# Patient Record
Sex: FEMALE | Race: WHITE | NOT HISPANIC OR LATINO | URBAN - METROPOLITAN AREA
[De-identification: names, ages, dates, MRNs, and addresses within clinical notes are randomized per-mention and may not be internally consistent; named-entity substitution may affect disease eponyms.]

---

## 2017-02-15 ENCOUNTER — EMERGENCY (EMERGENCY)
Facility: HOSPITAL | Age: 62
LOS: 1 days | Discharge: HOME | End: 2017-02-15
Attending: EMERGENCY MEDICINE | Admitting: EMERGENCY MEDICINE
Payer: COMMERCIAL

## 2017-02-15 ENCOUNTER — EMERGENCY (EMERGENCY)
Facility: HOSPITAL | Age: 62
LOS: 1 days | Discharge: ANOTHER HOSPITAL | End: 2017-02-15
Attending: EMERGENCY MEDICINE | Admitting: EMERGENCY MEDICINE
Payer: COMMERCIAL

## 2017-02-15 VITALS
DIASTOLIC BLOOD PRESSURE: 87 MMHG | OXYGEN SATURATION: 97 % | TEMPERATURE: 98 F | HEART RATE: 81 BPM | SYSTOLIC BLOOD PRESSURE: 139 MMHG | RESPIRATION RATE: 17 BRPM

## 2017-02-15 VITALS
SYSTOLIC BLOOD PRESSURE: 142 MMHG | HEIGHT: 66 IN | RESPIRATION RATE: 16 BRPM | WEIGHT: 169.09 LBS | TEMPERATURE: 99 F | OXYGEN SATURATION: 97 % | DIASTOLIC BLOOD PRESSURE: 82 MMHG | HEART RATE: 85 BPM

## 2017-02-15 VITALS
DIASTOLIC BLOOD PRESSURE: 78 MMHG | TEMPERATURE: 99 F | OXYGEN SATURATION: 100 % | RESPIRATION RATE: 20 BRPM | HEART RATE: 83 BPM | SYSTOLIC BLOOD PRESSURE: 153 MMHG

## 2017-02-15 DIAGNOSIS — S06.300A UNSPECIFIED FOCAL TRAUMATIC BRAIN INJURY WITHOUT LOSS OF CONSCIOUSNESS, INITIAL ENCOUNTER: ICD-10-CM

## 2017-02-15 DIAGNOSIS — R51 HEADACHE: ICD-10-CM

## 2017-02-15 DIAGNOSIS — S09.90XA UNSPECIFIED INJURY OF HEAD, INITIAL ENCOUNTER: ICD-10-CM
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
## 2017-02-15 DIAGNOSIS — Z88.3 ALLERGY STATUS TO OTHER ANTI-INFECTIVE AGENTS: ICD-10-CM

## 2017-02-15 DIAGNOSIS — Z88.8 ALLERGY STATUS TO OTHER DRUGS, MEDICAMENTS AND BIOLOGICAL SUBSTANCES STATUS: ICD-10-CM

## 2017-02-15 LAB
ALBUMIN SERPL ELPH-MCNC: 4.2 G/DL — SIGNIFICANT CHANGE UP (ref 3.3–5)
ALBUMIN SERPL ELPH-MCNC: 4.8 G/DL — SIGNIFICANT CHANGE UP (ref 3.3–5)
ALP SERPL-CCNC: 91 U/L — SIGNIFICANT CHANGE UP (ref 40–120)
ALP SERPL-CCNC: 94 U/L — SIGNIFICANT CHANGE UP (ref 40–120)
ALT FLD-CCNC: 20 U/L RC — SIGNIFICANT CHANGE UP (ref 10–45)
ALT FLD-CCNC: 27 U/L — SIGNIFICANT CHANGE UP (ref 12–78)
ANION GAP SERPL CALC-SCNC: 16 MMOL/L — SIGNIFICANT CHANGE UP (ref 5–17)
ANION GAP SERPL CALC-SCNC: 8 MMOL/L — SIGNIFICANT CHANGE UP (ref 5–17)
APTT BLD: 29.3 SEC — SIGNIFICANT CHANGE UP (ref 27.5–37.4)
APTT BLD: 29.4 SEC — SIGNIFICANT CHANGE UP (ref 27.5–37.4)
AST SERPL-CCNC: 23 U/L — SIGNIFICANT CHANGE UP (ref 15–37)
AST SERPL-CCNC: 26 U/L — SIGNIFICANT CHANGE UP (ref 10–40)
BASOPHILS # BLD AUTO: 0 K/UL — SIGNIFICANT CHANGE UP (ref 0–0.2)
BASOPHILS # BLD AUTO: 0.1 K/UL — SIGNIFICANT CHANGE UP (ref 0–0.2)
BASOPHILS NFR BLD AUTO: 0.2 % — SIGNIFICANT CHANGE UP (ref 0–2)
BASOPHILS NFR BLD AUTO: 0.9 % — SIGNIFICANT CHANGE UP (ref 0–2)
BILIRUB SERPL-MCNC: 0.4 MG/DL — SIGNIFICANT CHANGE UP (ref 0.2–1.2)
BILIRUB SERPL-MCNC: 0.4 MG/DL — SIGNIFICANT CHANGE UP (ref 0.2–1.2)
BUN SERPL-MCNC: 13 MG/DL — SIGNIFICANT CHANGE UP (ref 7–23)
BUN SERPL-MCNC: 14 MG/DL — SIGNIFICANT CHANGE UP (ref 7–23)
CALCIUM SERPL-MCNC: 8.8 MG/DL — SIGNIFICANT CHANGE UP (ref 8.5–10.1)
CALCIUM SERPL-MCNC: 9.9 MG/DL — SIGNIFICANT CHANGE UP (ref 8.4–10.5)
CHLORIDE SERPL-SCNC: 101 MMOL/L — SIGNIFICANT CHANGE UP (ref 96–108)
CHLORIDE SERPL-SCNC: 105 MMOL/L — SIGNIFICANT CHANGE UP (ref 96–108)
CO2 SERPL-SCNC: 22 MMOL/L — SIGNIFICANT CHANGE UP (ref 22–31)
CO2 SERPL-SCNC: 27 MMOL/L — SIGNIFICANT CHANGE UP (ref 22–31)
CREAT SERPL-MCNC: 0.8 MG/DL — SIGNIFICANT CHANGE UP (ref 0.5–1.3)
CREAT SERPL-MCNC: 0.81 MG/DL — SIGNIFICANT CHANGE UP (ref 0.5–1.3)
EOSINOPHIL # BLD AUTO: 0.1 K/UL — SIGNIFICANT CHANGE UP (ref 0–0.5)
EOSINOPHIL # BLD AUTO: 0.1 K/UL — SIGNIFICANT CHANGE UP (ref 0–0.5)
EOSINOPHIL NFR BLD AUTO: 0.6 % — SIGNIFICANT CHANGE UP (ref 0–6)
EOSINOPHIL NFR BLD AUTO: 0.7 % — SIGNIFICANT CHANGE UP (ref 0–6)
GLUCOSE SERPL-MCNC: 103 MG/DL — HIGH (ref 70–99)
GLUCOSE SERPL-MCNC: 95 MG/DL — SIGNIFICANT CHANGE UP (ref 70–99)
HCT VFR BLD CALC: 43.3 % — SIGNIFICANT CHANGE UP (ref 34.5–45)
HCT VFR BLD CALC: 43.7 % — SIGNIFICANT CHANGE UP (ref 34.5–45)
HGB BLD-MCNC: 14.5 G/DL — SIGNIFICANT CHANGE UP (ref 11.5–15.5)
HGB BLD-MCNC: 14.7 G/DL — SIGNIFICANT CHANGE UP (ref 11.5–15.5)
INR BLD: 1.1 RATIO — SIGNIFICANT CHANGE UP (ref 0.88–1.16)
INR BLD: 1.12 RATIO — SIGNIFICANT CHANGE UP (ref 0.88–1.16)
LYMPHOCYTES # BLD AUTO: 1.6 K/UL — SIGNIFICANT CHANGE UP (ref 1–3.3)
LYMPHOCYTES # BLD AUTO: 1.6 K/UL — SIGNIFICANT CHANGE UP (ref 1–3.3)
LYMPHOCYTES # BLD AUTO: 17.1 % — SIGNIFICANT CHANGE UP (ref 13–44)
LYMPHOCYTES # BLD AUTO: 17.3 % — SIGNIFICANT CHANGE UP (ref 13–44)
MCHC RBC-ENTMCNC: 29.4 PG — SIGNIFICANT CHANGE UP (ref 27–34)
MCHC RBC-ENTMCNC: 29.5 PG — SIGNIFICANT CHANGE UP (ref 27–34)
MCHC RBC-ENTMCNC: 33.5 GM/DL — SIGNIFICANT CHANGE UP (ref 32–36)
MCHC RBC-ENTMCNC: 33.7 GM/DL — SIGNIFICANT CHANGE UP (ref 32–36)
MCV RBC AUTO: 87.4 FL — SIGNIFICANT CHANGE UP (ref 80–100)
MCV RBC AUTO: 87.5 FL — SIGNIFICANT CHANGE UP (ref 80–100)
MONOCYTES # BLD AUTO: 0.7 K/UL — SIGNIFICANT CHANGE UP (ref 0–0.9)
MONOCYTES # BLD AUTO: 0.7 K/UL — SIGNIFICANT CHANGE UP (ref 0–0.9)
MONOCYTES NFR BLD AUTO: 7.5 % — SIGNIFICANT CHANGE UP (ref 1–9)
MONOCYTES NFR BLD AUTO: 7.6 % — SIGNIFICANT CHANGE UP (ref 2–14)
NEUTROPHILS # BLD AUTO: 7.1 K/UL — SIGNIFICANT CHANGE UP (ref 1.8–7.4)
NEUTROPHILS # BLD AUTO: 7.1 K/UL — SIGNIFICANT CHANGE UP (ref 1.8–7.4)
NEUTROPHILS NFR BLD AUTO: 73.8 % — SIGNIFICANT CHANGE UP (ref 43–77)
NEUTROPHILS NFR BLD AUTO: 74.2 % — SIGNIFICANT CHANGE UP (ref 43–77)
PLATELET # BLD AUTO: 230 K/UL — SIGNIFICANT CHANGE UP (ref 150–400)
PLATELET # BLD AUTO: 231 K/UL — SIGNIFICANT CHANGE UP (ref 150–400)
POTASSIUM SERPL-MCNC: 4.3 MMOL/L — SIGNIFICANT CHANGE UP (ref 3.5–5.3)
POTASSIUM SERPL-MCNC: 4.6 MMOL/L — SIGNIFICANT CHANGE UP (ref 3.5–5.3)
POTASSIUM SERPL-SCNC: 4.3 MMOL/L — SIGNIFICANT CHANGE UP (ref 3.5–5.3)
POTASSIUM SERPL-SCNC: 4.6 MMOL/L — SIGNIFICANT CHANGE UP (ref 3.5–5.3)
PROT SERPL-MCNC: 8.1 G/DL — SIGNIFICANT CHANGE UP (ref 6–8.3)
PROT SERPL-MCNC: 8.2 G/DL — SIGNIFICANT CHANGE UP (ref 6–8.3)
PROTHROM AB SERPL-ACNC: 11.9 SEC — SIGNIFICANT CHANGE UP (ref 10–13.1)
PROTHROM AB SERPL-ACNC: 12.4 SEC — SIGNIFICANT CHANGE UP (ref 10–13.1)
RBC # BLD: 4.95 M/UL — SIGNIFICANT CHANGE UP (ref 3.8–5.2)
RBC # BLD: 5 M/UL — SIGNIFICANT CHANGE UP (ref 3.8–5.2)
RBC # FLD: 11.3 % — SIGNIFICANT CHANGE UP (ref 10.3–14.5)
RBC # FLD: 11.7 % — SIGNIFICANT CHANGE UP (ref 10.3–14.5)
SODIUM SERPL-SCNC: 139 MMOL/L — SIGNIFICANT CHANGE UP (ref 135–145)
SODIUM SERPL-SCNC: 140 MMOL/L — SIGNIFICANT CHANGE UP (ref 135–145)
WBC # BLD: 9.5 K/UL — SIGNIFICANT CHANGE UP (ref 3.8–10.5)
WBC # BLD: 9.6 K/UL — SIGNIFICANT CHANGE UP (ref 3.8–10.5)
WBC # FLD AUTO: 9.5 K/UL — SIGNIFICANT CHANGE UP (ref 3.8–10.5)
WBC # FLD AUTO: 9.6 K/UL — SIGNIFICANT CHANGE UP (ref 3.8–10.5)

## 2017-02-15 PROCEDURE — 93005 ELECTROCARDIOGRAM TRACING: CPT

## 2017-02-15 PROCEDURE — 85610 PROTHROMBIN TIME: CPT

## 2017-02-15 PROCEDURE — 71046 X-RAY EXAM CHEST 2 VIEWS: CPT

## 2017-02-15 PROCEDURE — 99284 EMERGENCY DEPT VISIT MOD MDM: CPT

## 2017-02-15 PROCEDURE — 85027 COMPLETE CBC AUTOMATED: CPT

## 2017-02-15 PROCEDURE — 99285 EMERGENCY DEPT VISIT HI MDM: CPT

## 2017-02-15 PROCEDURE — 99285 EMERGENCY DEPT VISIT HI MDM: CPT | Mod: 25

## 2017-02-15 PROCEDURE — 80053 COMPREHEN METABOLIC PANEL: CPT

## 2017-02-15 PROCEDURE — 71020: CPT | Mod: 26

## 2017-02-15 PROCEDURE — 85730 THROMBOPLASTIN TIME PARTIAL: CPT

## 2017-02-15 RX ORDER — LEVETIRACETAM 250 MG/1
1000 TABLET, FILM COATED ORAL ONCE
Qty: 0 | Refills: 0 | Status: COMPLETED | OUTPATIENT
Start: 2017-02-15 | End: 2017-02-15

## 2017-02-15 RX ORDER — SODIUM CHLORIDE 9 MG/ML
3 INJECTION INTRAMUSCULAR; INTRAVENOUS; SUBCUTANEOUS ONCE
Qty: 0 | Refills: 0 | Status: COMPLETED | OUTPATIENT
Start: 2017-02-15 | End: 2017-02-15

## 2017-02-15 RX ORDER — ACETAMINOPHEN 500 MG
1000 TABLET ORAL ONCE
Qty: 0 | Refills: 0 | Status: COMPLETED | OUTPATIENT
Start: 2017-02-15 | End: 2017-02-15

## 2017-02-15 RX ADMIN — LEVETIRACETAM 400 MILLIGRAM(S): 250 TABLET, FILM COATED ORAL at 21:12

## 2017-02-15 RX ADMIN — Medication 1000 MILLIGRAM(S): at 23:00

## 2017-02-15 RX ADMIN — SODIUM CHLORIDE 3 MILLILITER(S): 9 INJECTION INTRAMUSCULAR; INTRAVENOUS; SUBCUTANEOUS at 18:36

## 2017-02-15 RX ADMIN — Medication 400 MILLIGRAM(S): at 22:29

## 2017-02-15 NOTE — ED ADULT NURSE NOTE - OBJECTIVE STATEMENT
61 y.o. Female transferred from [place] for 6mm subdural hematoma. Pt reports slipping on ice and falling on the back of her head last Friday. Neuro intact. A&Ox3. Denies headache, vision/hearing loss, weakness/numbness. Hx hyperlipidemia. Denies CP, SOB, N/V/D, urinary/bowel complications, fever/chills. Pt is in no current distress. Comfort and safety provided. Family at bedside. 61 y.o. Female transferred from [place] for 6mm subdural hematoma. Pt reports slipping on ice and falling on the back of her head last Friday. Neuro intact. A&Ox3. Denies headache, vision/hearing loss, weakness/numbness. Hx hyperlipidemia. Denies CP, SOB, N/V/D, urinary/bowel complications, fever/chills. Pt arrived with 20g on L wrist from [place]. Pt is in no current distress. Comfort and safety provided. Family at bedside.

## 2017-02-15 NOTE — ED PROVIDER NOTE - CHPI ED SYMPTOMS NEG
no vomiting/no blurred vision/no numbness/no weakness/no fever/no confusion/no loss of consciousness/no change in level of consciousness

## 2017-02-15 NOTE — ED PROVIDER NOTE - OBJECTIVE STATEMENT
62 yo F p/w slip and fall on ice 5 days ago. Pt hit back of head. NO loc. pt with on / off headache since that time. Pt seen by PMD and sent for CT today. Pt found to have SDH / SAH and sent to ed. No numb/ting/focal weak. no visual changes. No dizzy / palp. No neck / back pain. no agg/allev factors. no other inj or co.

## 2017-02-15 NOTE — ED PROVIDER NOTE - ENMT, MLM
Airway patent, Nasal mucosa clear. Mouth with normal mucosa. Throat has no vesicles, no oropharyngeal exudates and uvula is midline. Small occipital hematoma. No other signs of head trauma.

## 2017-02-15 NOTE — ED PROVIDER NOTE - OBJECTIVE STATEMENT
61yoF pmh hld s/p fall backwards on ice 5 days ago, no loc come of intermittetn mild h/a since fall (worse when standing) tx from [facility] with noted Ich (sdh/sah).  In Ed pt reports +h/a, no visual changes, no confusion, no neck pain, no n/v/d, no cp/sob, no abd pain.     pmd - maninder fletcher ([facility]) 61yoF pmh hld s/p fall backwards on ice 5 days ago, no loc come of intermittent mild h/a since fall (worse when standing) tx from [facility] with noted Ich (sdh/sah).  In Ed pt reports +h/a, no visual changes, no confusion, no neck pain, no n/v/d, no cp/sob, no abd pain. no focal neuro deficits.     pmd - maninder fletcher ([facility])

## 2017-02-15 NOTE — ED PROVIDER NOTE - PROGRESS NOTE DETAILS
re:eval- pt resting comfortably, NAd, pt report no h/a, no focali neuro deficits, pt able to ambulate with steady gait, will dc with keppra and NS follow up - HS

## 2017-02-15 NOTE — ED PROVIDER NOTE - MEDICAL DECISION MAKING DETAILS
61yoF pmh hld s/p fall backwards on ice 5 days ago, no loc come of intermittent mild h/a since fall (worse when standing) tx from [facility] with noted Ich (sdh/sah), Neuro checks, neurosurg/trauma consult, reassess f/u w Neurosurg

## 2017-02-15 NOTE — ED PROVIDER NOTE - MUSCULOSKELETAL, MLM
Spine appears normal, No spinal tend (c,t,l),. range of motion is not limited, no muscle or joint tenderness

## 2017-02-15 NOTE — ED ADULT NURSE NOTE - OBJECTIVE STATEMENT
Pt. received alert and oriented x4 with chief complaint of pressure to left side of back of the head post fall on 2/10/2017. Pt. denies LOC. Pt. placed on continuos cardiac monitor with continuous pulse ox.

## 2017-02-15 NOTE — ED PROVIDER NOTE - PROGRESS NOTE DETAILS
Dw Neurosurg - Dr Fortino Fuentes - [phone] - states should xfer to [facility]. [facility] Xfer [place]  [facility] pt re need for xfer for neurosurgical rx / eval. Dw Neurosurg - Dr Fortino Fuentes - [phone] - states should xfer to [facility]. [facility] Xfer [place], RICCARDO [facility] , ED accepts xfer  Dw pt re need for xfer for neurosurgical rx / eval.

## 2017-02-16 VITALS
HEART RATE: 72 BPM | TEMPERATURE: 99 F | DIASTOLIC BLOOD PRESSURE: 68 MMHG | RESPIRATION RATE: 18 BRPM | SYSTOLIC BLOOD PRESSURE: 120 MMHG | OXYGEN SATURATION: 96 %

## 2017-02-16 PROBLEM — Z00.00 ENCOUNTER FOR PREVENTIVE HEALTH EXAMINATION: Status: ACTIVE | Noted: 2017-02-16

## 2017-02-16 PROCEDURE — 80053 COMPREHEN METABOLIC PANEL: CPT

## 2017-02-16 PROCEDURE — 70450 CT HEAD/BRAIN W/O DYE: CPT

## 2017-02-16 PROCEDURE — 85730 THROMBOPLASTIN TIME PARTIAL: CPT

## 2017-02-16 PROCEDURE — 96375 TX/PRO/DX INJ NEW DRUG ADDON: CPT

## 2017-02-16 PROCEDURE — 70450 CT HEAD/BRAIN W/O DYE: CPT | Mod: 26

## 2017-02-16 PROCEDURE — 85027 COMPLETE CBC AUTOMATED: CPT

## 2017-02-16 PROCEDURE — 85610 PROTHROMBIN TIME: CPT

## 2017-02-16 PROCEDURE — 96374 THER/PROPH/DIAG INJ IV PUSH: CPT

## 2017-02-16 PROCEDURE — 99284 EMERGENCY DEPT VISIT MOD MDM: CPT | Mod: 25

## 2017-02-16 RX ORDER — LEVETIRACETAM 250 MG/1
1 TABLET, FILM COATED ORAL
Qty: 60 | Refills: 0 | OUTPATIENT
Start: 2017-02-16 | End: 2017-03-18

## 2017-02-16 RX ORDER — ONDANSETRON 8 MG/1
1 TABLET, FILM COATED ORAL
Qty: 12 | Refills: 0 | OUTPATIENT
Start: 2017-02-16 | End: 2017-02-20

## 2017-02-16 RX ORDER — ACETAMINOPHEN 500 MG
650 TABLET ORAL ONCE
Qty: 0 | Refills: 0 | Status: COMPLETED | OUTPATIENT
Start: 2017-02-16 | End: 2017-02-16

## 2017-02-16 RX ADMIN — Medication 650 MILLIGRAM(S): at 05:24

## 2017-02-16 NOTE — ED POST DISCHARGE NOTE - ADDITIONAL DOCUMENTATION
pt and pt  called with concern headache control. pt reports advil had helped better but she was told not to take advil. pt instructed to DEFINITELY NOT TAKE ADVIL/MOTRIN/IBUPROFEN/ASPIRIN, pt understands and agrees. pt will continue Tylenol. Pt reports Nausea - mild after Keppra, prescription for Zofran sent to pharmacy. Educated patient on all "red flag" head symptoms and to return immediately if any should become apparent, Also, pt instructed to not do any strenuous activity or heavy lifting. pt understands and agrees.

## 2017-02-16 NOTE — ED ADULT NURSE REASSESSMENT NOTE - COMFORT CARE
side rails up/wait time explained/warm blanket provided
side rails up/warm blanket provided/plan of care explained

## 2017-02-21 ENCOUNTER — APPOINTMENT (OUTPATIENT)
Dept: NEUROSURGERY | Facility: CLINIC | Age: 62
End: 2017-02-21

## 2017-02-21 VITALS
SYSTOLIC BLOOD PRESSURE: 113 MMHG | HEART RATE: 67 BPM | DIASTOLIC BLOOD PRESSURE: 70 MMHG | HEIGHT: 65 IN | BODY MASS INDEX: 27.49 KG/M2 | WEIGHT: 165 LBS

## 2017-02-21 DIAGNOSIS — I62.01 NONTRAUMATIC ACUTE SUBDURAL HEMORRHAGE: ICD-10-CM

## 2017-02-21 DIAGNOSIS — S06.5X0D TRAUMATIC SUBDURAL HEMORRHAGE W/OUT LOSS OF CONSCIOUSNESS, SUBSEQUENT ENCOUNTER: ICD-10-CM

## 2017-02-21 RX ORDER — ACETAMINOPHEN 500 MG
500 TABLET ORAL EVERY 6 HOURS
Refills: 0 | Status: ACTIVE | COMMUNITY

## 2017-03-02 ENCOUNTER — FORM ENCOUNTER (OUTPATIENT)
Age: 62
End: 2017-03-02

## 2017-03-03 ENCOUNTER — APPOINTMENT (OUTPATIENT)
Dept: CT IMAGING | Facility: CLINIC | Age: 62
End: 2017-03-03

## 2017-03-03 ENCOUNTER — OUTPATIENT (OUTPATIENT)
Dept: OUTPATIENT SERVICES | Facility: HOSPITAL | Age: 62
LOS: 1 days | End: 2017-03-03
Payer: COMMERCIAL

## 2017-03-03 DIAGNOSIS — I62.01 NONTRAUMATIC ACUTE SUBDURAL HEMORRHAGE: ICD-10-CM

## 2017-03-03 PROCEDURE — 70450 CT HEAD/BRAIN W/O DYE: CPT

## 2018-07-10 NOTE — ED PROVIDER NOTE - ENMT, MLM
iterative reconstruction. MIP reconstructed images were created and reviewed. CONTRAST: 100 mL of 370 iso administered intravenously. COMPARISON: CT CTA HEAD 2013-05-25 19:02 FINDINGS: VASCULATURE: Right common carotid artery:  Normal.  No significant stenosis. No dissection or occlusion. Right internal carotid artery:  Normal.  Extracranial segment is patent with no significant stenosis. No dissection or occlusion. Right external carotid artery:  Normal.  No occlusion. Right vertebral artery:  Normal.  No significant stenosis. No dissection or occlusion. Left common carotid artery:  Normal.  No significant stenosis. No dissection or occlusion. Left internal carotid artery:  Normal.  Extracranial segment is patent with no significant stenosis. No dissection or occlusion. Left external carotid artery:  Normal.  No occlusion. Left vertebral artery:  Normal.  No significant stenosis. No dissection or occlusion. NECK: Bones/joints:  No acute fracture. No dislocation. Soft tissues:  Normal as visualized. No mass. CAROTID STENOSIS REFERENCE USING NASCET CRITERIA: % ICA stenosis = (1 - narrowest ICA diameter/diameter of distal cervical ICA) x 100. Mild - <50% stenosis. Moderate - 50-69% stenosis. Severe - 70-94% stenosis. Near occlusion - 95-99% stenosis. Occluded - 100% stenosis. IMPRESSION:     Normal neck CTA. THIS REPORT CONTAINS FINDINGS THAT MAY BE CRITICAL TO PATIENT CARE. The findings were verbally communicated via telephone conference with Dr. Leora Fontenot at 4:42 AM EDT on 7/9/2018. The findings were acknowledged and understood. This addendum has been electronically signed by Marlin Lepe MD.    Result Date: 7/9/2018  EXAM: CT Angiography Neck With Intravenous Contrast CLINICAL HISTORY: 38years old, female; Pain; Additional info: Stroke TECHNIQUE: Axial computed tomographic angiography images of the neck with intravenous contrast using CT angiography protocol.   All CT scans at this facility use at least one parametric maps were created and reviewed. These include cerebral blood flow, cerebral blood volume and mean transit time. All CT scans at this facility use at least one of these dose optimization techniques: automated exposure control; mA and/or kV adjustment per patient size (includes targeted exams where dose is matched to clinical indication); or iterative reconstruction. MIP reconstructed images were created and reviewed. CONTRAST: 100 mL of 370 iso administered intravenously. COMPARISON: CT HEAD WO CONTRAST 2018-07-09 01:39 FINDINGS: Cerebral blood flow:  Normal.  Symmetric with no defects. Cerebral blood volume:  Normal.  Symmetric. Mean transit time:  Normal.  No delayed perfusion. Brain:  Normal.  No hemorrhage. No edema. Normal enhancement. Ventricles:  Normal. Bones/joints:  No acute fracture. Soft tissues:  Normal. Sinuses:  Normal. Mastoid air cells:  Normal as visualized. No mastoid effusion. IMPRESSION:     Normal brain perfusion CT. THIS REPORT CONTAINS FINDINGS THAT MAY BE CRITICAL TO PATIENT CARE. The findings were verbally communicated via telephone conference with Dr. Verónica Roy at 4:40 AM EDT on 7/9/2018. The findings were acknowledged and understood. This addendum has been electronically signed by Tracie Osullivan MD.    Result Date: 7/9/2018  EXAM: CT Brain Perfusion With Intravenous Contrast CLINICAL HISTORY: 38years old, female; Pain; Headache; Additional info: Focal neuro deficit, new, fixed or worsening, less than 3 hours TECHNIQUE: Axial computed tomography images of the brain with intravenous contrast using cerebral perfusion protocol. Post-processing parametric maps were created and reviewed. These include cerebral blood flow, cerebral blood volume and mean transit time.   All CT scans at this facility use at least one of these dose optimization techniques: automated exposure control; mA and/or kV adjustment per patient size (includes targeted exams where dose is matched to clinical indication); or iterative reconstruction. MIP reconstructed images were created and reviewed. CONTRAST: 100 mL of 370 iso administered intravenously. COMPARISON: CT HEAD WO CONTRAST 2018-07-09 01:39 FINDINGS: Cerebral blood flow:  Normal.  Symmetric with no defects. Cerebral blood volume:  Normal.  Symmetric. Mean transit time:  Normal.  No delayed perfusion. Brain:  Normal.  No hemorrhage. No edema. Normal enhancement. Ventricles:  Normal. Bones/joints:  No acute fracture. Soft tissues:  Normal. Sinuses:  Normal. Mastoid air cells:  Normal as visualized. No mastoid effusion. Normal brain perfusion CT. This report has been electronically signed by Eva Vela MD.    Cta Head W Contrast    Addendum Date: 7/9/2018    EXAM: CT Angiography Head With Intravenous Contrast CLINICAL HISTORY: 38years old, female; Pain; Additional info: Stroke TECHNIQUE: Axial computed tomographic angiography images of the head with intravenous contrast using CT angiography protocol. All CT scans at this facility use at least one of these dose optimization techniques: automated exposure control; mA and/or kV adjustment per patient size (includes targeted exams where dose is matched to clinical indication); or iterative reconstruction. MIP reconstructed images were created and reviewed. CONTRAST: 100 mL of 370 iso administered intravenously. COMPARISON: CT CTA HEAD 2013-05-25 19:02 FINDINGS: Right internal carotid artery:  No acute findings. Intracranial segment is patent with no significant stenosis. No aneurysm. Right anterior cerebral artery:  Normal.  No occlusion or significant stenosis. No aneurysm. Right middle cerebral artery:  Normal.  No occlusion or significant stenosis. No aneurysm. Right posterior cerebral artery:  Normal.  No occlusion or significant stenosis. No aneurysm. Right vertebral artery:  Normal as visualized. Left internal carotid artery:  No acute findings.   Intracranial segment is patent with no significant stenosis. No aneurysm. Left anterior cerebral artery:  Normal.  No occlusion or significant stenosis. No aneurysm. Left middle cerebral artery:  Normal.  No occlusion or significant stenosis. No aneurysm. Left posterior cerebral artery:  Normal.  No occlusion or significant stenosis. No aneurysm. Left vertebral artery:  Normal as visualized. Basilar artery:  Normal.  No occlusion or significant stenosis. No aneurysm. IMPRESSION:     Normal head CTA. THIS REPORT CONTAINS FINDINGS THAT MAY BE CRITICAL TO PATIENT CARE. The findings were verbally communicated via telephone conference with Dr. Alex Escobar at 4:43 AM EDT on 7/9/2018. The findings were acknowledged and understood. This addendum has been electronically signed by Sylvester Carbajal MD.    Result Date: 7/9/2018  EXAM: CT Angiography Head With Intravenous Contrast CLINICAL HISTORY: 38years old, female; Pain; Additional info: Stroke TECHNIQUE: Axial computed tomographic angiography images of the head with intravenous contrast using CT angiography protocol. All CT scans at this facility use at least one of these dose optimization techniques: automated exposure control; mA and/or kV adjustment per patient size (includes targeted exams where dose is matched to clinical indication); or iterative reconstruction. MIP reconstructed images were created and reviewed. CONTRAST: 100 mL of 370 iso administered intravenously. COMPARISON: CT CTA HEAD 2013-05-25 19:02 FINDINGS: Right internal carotid artery:  No acute findings. Intracranial segment is patent with no significant stenosis. No aneurysm. Right anterior cerebral artery:  Normal.  No occlusion or significant stenosis. No aneurysm. Right middle cerebral artery:  Normal.  No occlusion or significant stenosis. No aneurysm. Right posterior cerebral artery:  Normal.  No occlusion or significant stenosis. No aneurysm. Right vertebral artery:  Normal as visualized.  Left internal carotid artery:  No acute findings. Intracranial segment is patent with no significant stenosis. No aneurysm. Left anterior cerebral artery:  Normal.  No occlusion or significant stenosis. No aneurysm. Left middle cerebral artery:  Normal.  No occlusion or significant stenosis. No aneurysm. Left posterior cerebral artery:  Normal.  No occlusion or significant stenosis. No aneurysm. Left vertebral artery:  Normal as visualized. Basilar artery:  Normal.  No occlusion or significant stenosis. No aneurysm. Normal head CTA. This report has been electronically signed by Eva Vela MD.      Discharge Medications:      Medication List      START taking these medications    aspirin EC 81 MG EC tablet  Take 1 tablet by mouth daily     atorvastatin 80 MG tablet  Commonly known as:  LIPITOR  Take 1 tablet by mouth nightly     nicotine polacrilex 2 MG gum  Commonly known as:  NICORETTE  Take 1 each by mouth as needed for Smoking cessation Maximum dose: 24 pieces/24 hours. CONTINUE taking these medications    ARIPiprazole 15 MG tablet  Commonly known as:  ABILIFY     eszopiclone 3 MG Tabs  Commonly known as:  LUNESTA     metoprolol tartrate 25 MG tablet  Commonly known as:  LOPRESSOR     sertraline 100 MG tablet  Commonly known as:  ZOLOFT        STOP taking these medications    hydrOXYzine 25 MG capsule  Commonly known as:  VISTARIL     meloxicam 15 MG tablet  Commonly known as:  MOBIC           Where to Get Your Medications      These medications were sent to [address] [place], [place] [id number] - F [phone]  [address] [place] [id number]    Phone:  [phone]   · aspirin EC 81 MG EC tablet  · atorvastatin 80 MG tablet     You can get these medications from any pharmacy    You don't need a prescription for these medications  · nicotine polacrilex 2 MG gum       Thank you for the opportunity to be involved in this patient's care.  If you have any questions or concerns Airway patent, Nasal mucosa clear. Mouth with normal mucosa. Throat has no vesicles, no oropharyngeal exudates and uvula is midline.

## 2023-04-17 ENCOUNTER — APPOINTMENT (OUTPATIENT)
Dept: ORTHOPEDIC SURGERY | Facility: CLINIC | Age: 68
End: 2023-04-17
Payer: MEDICARE

## 2023-04-17 VITALS — WEIGHT: 165 LBS | HEIGHT: 65 IN | BODY MASS INDEX: 27.49 KG/M2

## 2023-04-17 DIAGNOSIS — M19.049 PRIMARY OSTEOARTHRITIS, UNSPECIFIED HAND: ICD-10-CM

## 2023-04-17 DIAGNOSIS — M65.332 TRIGGER FINGER, LEFT MIDDLE FINGER: ICD-10-CM

## 2023-04-17 PROCEDURE — 73130 X-RAY EXAM OF HAND: CPT | Mod: 50

## 2023-04-17 PROCEDURE — 20550 NJX 1 TENDON SHEATH/LIGAMENT: CPT

## 2023-04-17 PROCEDURE — 99203 OFFICE O/P NEW LOW 30 MIN: CPT | Mod: 25

## 2023-04-17 PROCEDURE — 99213 OFFICE O/P EST LOW 20 MIN: CPT | Mod: 25

## 2023-04-17 RX ORDER — MELOXICAM 15 MG/1
15 TABLET ORAL
Qty: 30 | Refills: 0 | Status: ACTIVE | COMMUNITY
Start: 2023-04-17 | End: 1900-01-01

## 2023-04-17 NOTE — ASSESSMENT
[FreeTextEntry1] : will administer csi left 3rd trigger, add mobic possible csi to cmc joints if pain persists

## 2023-04-17 NOTE — PROCEDURE
[Tendon Sheath] : tendon sheath [Left] : of the left [Pain] : pain [Inflammation] : inflammation [Alcohol] : alcohol [Betadine] : betadine [Ethyl Chloride sprayed topically] : ethyl chloride sprayed topically [Sterile technique used] : sterile technique used [___ cc    6mg] :  Betamethasone (Celestone) ~Vcc of 6mg [___ cc    1%] : Lidocaine ~Vcc of 1%  [] : Patient tolerated procedure well [Call if redness, pain or fever occur] : call if redness, pain or fever occur [Apply ice for 15min out of every hour for the next 12-24 hours as tolerated] : apply ice for 15 minutes out of every hour for the next 12-24 hours as tolerated [Patient was advised to rest the joint(s) for ____ days] : patient was advised to rest the joint(s) for [unfilled] days [Previous OTC use and PT nontherapeutic] : patient has tried OTC's including aspirin, Ibuprofen, Aleve, etc or prescription NSAIDS, and/or exercises at home and/or physical therapy without satisfactory response [Patient had decreased mobility in the joint] : patient had decreased mobility in the joint [Risks, benefits, alternatives discussed / Verbal consent obtained] : the risks benefits, and alternatives have been discussed, and verbal consent was obtained

## 2023-04-17 NOTE — PHYSICAL EXAM
[1st] : 1st [A1-Pulley] : A1-pulley [CMC Joint] : CMC joint [] : positive thumb basal grind [3rd] : 3rd [Bilateral] : hand bilaterally [Degenerative change] : Degenerative change [FreeTextEntry1] : cmc oa worse on right

## 2023-04-17 NOTE — HISTORY OF PRESENT ILLNESS
[Gradual] : gradual [de-identified] : 4-17-23- she notes triggering to the left 3rd finger wakes her at night. also pain b/l cmc joints pain with gripping and grasping  [] : no [FreeTextEntry1] : b/l hands  [FreeTextEntry3] : over a year  [FreeTextEntry5] : no injury, patient is feeling soreness in both hands and locking in her left mid finger. patient is right hand dominant.

## 2023-05-26 ENCOUNTER — APPOINTMENT (OUTPATIENT)
Dept: ORTHOPEDIC SURGERY | Facility: CLINIC | Age: 68
End: 2023-05-26
Payer: MEDICARE

## 2023-05-26 VITALS — WEIGHT: 165 LBS | BODY MASS INDEX: 27.49 KG/M2 | HEIGHT: 65 IN

## 2023-05-26 DIAGNOSIS — M79.601 PAIN IN RIGHT ARM: ICD-10-CM

## 2023-05-26 DIAGNOSIS — M77.8 OTHER ENTHESOPATHIES, NOT ELSEWHERE CLASSIFIED: ICD-10-CM

## 2023-05-26 PROCEDURE — 73090 X-RAY EXAM OF FOREARM: CPT | Mod: RT

## 2023-05-26 PROCEDURE — 99213 OFFICE O/P EST LOW 20 MIN: CPT

## 2023-05-26 NOTE — HISTORY OF PRESENT ILLNESS
[Gradual] : gradual [Localized] : localized [de-identified] : Has soreness right forearm, sometimes wakes up with it in the morning, sometimes activity related. No injury. No elbow or neck pain [] : no [FreeTextEntry1] : right forearm  [FreeTextEntry3] : 2 weeks  [FreeTextEntry5] : no injury, patient is feeling pain in the forearm that seems has gotten better. [de-identified] : in the morning

## 2023-05-26 NOTE — PHYSICAL EXAM
[NL (0)] : extension 0 degrees [5___] : supination 5[unfilled]/5 [] : no lateral elbow pain with resisted wrist extension [Right] : right forearm [There are no fractures, subluxations or dislocations. No significant abnormalities are seen] : There are no fractures, subluxations or dislocations. No significant abnormalities are seen [TWNoteComboBox7] : flexion 130 degrees [TWNoteComboBox6] : pronation 80 degrees [de-identified] : supination 80 degrees

## 2023-10-30 ENCOUNTER — RX ONLY (RX ONLY)
Age: 68
End: 2023-10-30

## 2023-10-30 ENCOUNTER — OFFICE (OUTPATIENT)
Dept: URBAN - METROPOLITAN AREA CLINIC 109 | Facility: CLINIC | Age: 68
Setting detail: OPHTHALMOLOGY
End: 2023-10-30
Payer: MEDICARE

## 2023-10-30 DIAGNOSIS — H40.033: ICD-10-CM

## 2023-10-30 DIAGNOSIS — H18.513: ICD-10-CM

## 2023-10-30 DIAGNOSIS — H25.13: ICD-10-CM

## 2023-10-30 PROCEDURE — 76514 ECHO EXAM OF EYE THICKNESS: CPT | Performed by: OPHTHALMOLOGY

## 2023-10-30 PROCEDURE — 92004 COMPRE OPH EXAM NEW PT 1/>: CPT | Performed by: OPHTHALMOLOGY

## 2023-10-30 PROCEDURE — 92020 GONIOSCOPY: CPT | Performed by: OPHTHALMOLOGY

## 2023-10-30 PROCEDURE — 92286 ANT SGM IMG I&R SPECLR MIC: CPT | Performed by: OPHTHALMOLOGY

## 2023-10-30 ASSESSMENT — CONFRONTATIONAL VISUAL FIELD TEST (CVF)
OS_FINDINGS: FULL
OD_FINDINGS: FULL

## 2023-10-30 ASSESSMENT — REFRACTION_AUTOREFRACTION
OD_SPHERE: +4.50
OD_CYLINDER: -0.25
OS_SPHERE: +4.25
OS_CYLINDER: -0.75
OS_AXIS: 040
OD_AXIS: 142

## 2023-10-30 ASSESSMENT — REFRACTION_CURRENTRX
OD_OVR_VA: 20/
OS_AXIS: 033
OS_VPRISM_DIRECTION: PROGS
OS_ADD: +2.00
OD_SPHERE: +4.75
OD_ADD: +2.00
OS_CYLINDER: -1.00
OS_OVR_VA: 20/
OD_VPRISM_DIRECTION: PROGS
OS_SPHERE: +4.25
OD_CYLINDER: -1.00
OD_AXIS: 137

## 2023-10-30 ASSESSMENT — PACHYMETRY
OS_CT_UM: 526
OD_CT_UM: 531
OS_CT_CORRECTION: 1
OD_CT_CORRECTION: 1

## 2023-10-30 ASSESSMENT — TONOMETRY
OD_IOP_MMHG: 13
OS_IOP_MMHG: 11

## 2023-10-30 ASSESSMENT — KERATOMETRY
OS_K2POWER_DIOPTERS: 43.25
OD_AXISANGLE_DEGREES: 070
OS_AXISANGLE_DEGREES: 115
OD_K1POWER_DIOPTERS: 42.00
OD_K2POWER_DIOPTERS: 43.00
OS_K1POWER_DIOPTERS: 42.25

## 2023-10-30 ASSESSMENT — AXIALLENGTH_DERIVED
OD_AL: 22.3264
OS_AL: 22.419

## 2023-10-30 ASSESSMENT — SPHEQUIV_DERIVED
OS_SPHEQUIV: 3.875
OD_SPHEQUIV: 4.375

## 2023-10-30 ASSESSMENT — CORNEAL DYSTROPHY - POSTERIOR
OS_POSTERIORDYSTROPHY: FUCHS GUTTATA
OD_POSTERIORDYSTROPHY: FUCHS GUTTATA

## 2023-10-30 ASSESSMENT — VISUAL ACUITY
OD_BCVA: 20/20-1
OS_BCVA: 20/20-2

## 2024-12-02 ENCOUNTER — OFFICE (OUTPATIENT)
Dept: URBAN - METROPOLITAN AREA CLINIC 109 | Facility: CLINIC | Age: 69
Setting detail: OPHTHALMOLOGY
End: 2024-12-02
Payer: MEDICARE

## 2024-12-02 DIAGNOSIS — H18.513: ICD-10-CM

## 2024-12-02 DIAGNOSIS — H40.033: ICD-10-CM

## 2024-12-02 DIAGNOSIS — H25.13: ICD-10-CM

## 2024-12-02 PROCEDURE — 92286 ANT SGM IMG I&R SPECLR MIC: CPT | Performed by: OPHTHALMOLOGY

## 2024-12-02 PROCEDURE — 92133 CPTRZD OPH DX IMG PST SGM ON: CPT | Performed by: OPHTHALMOLOGY

## 2024-12-02 PROCEDURE — 92020 GONIOSCOPY: CPT | Performed by: OPHTHALMOLOGY

## 2024-12-02 PROCEDURE — 92014 COMPRE OPH EXAM EST PT 1/>: CPT | Performed by: OPHTHALMOLOGY

## 2024-12-02 ASSESSMENT — REFRACTION_AUTOREFRACTION
OS_AXIS: 049
OD_SPHERE: +4.50
OD_AXIS: 129
OS_CYLINDER: -0.50
OD_CYLINDER: -0.50
OS_SPHERE: +4.25

## 2024-12-02 ASSESSMENT — REFRACTION_CURRENTRX
OD_ADD: +2.25
OD_OVR_VA: 20/
OS_OVR_VA: 20/
OD_AXIS: 140
OS_AXIS: 42
OD_SPHERE: +5.00
OS_VPRISM_DIRECTION: PROGS
OS_ADD: +2.25
OS_SPHERE: +3.75
OD_CYLINDER: -1.00
OS_CYLINDER: -0.75
OD_VPRISM_DIRECTION: PROGS

## 2024-12-02 ASSESSMENT — CONFRONTATIONAL VISUAL FIELD TEST (CVF)
OD_FINDINGS: FULL
OS_FINDINGS: FULL

## 2024-12-02 ASSESSMENT — KERATOMETRY
OS_K1POWER_DIOPTERS: 42.25
OD_K1POWER_DIOPTERS: 42.00
OD_AXISANGLE_DEGREES: 070
OS_AXISANGLE_DEGREES: 115
OS_K2POWER_DIOPTERS: 43.25
OD_K2POWER_DIOPTERS: 43.00

## 2024-12-02 ASSESSMENT — PACHYMETRY
OD_CT_UM: 531
OS_CT_CORRECTION: 1
OS_CT_UM: 526
OD_CT_CORRECTION: 1

## 2024-12-02 ASSESSMENT — VISUAL ACUITY
OS_BCVA: 20/25-2
OD_BCVA: 20/25+2

## 2024-12-02 ASSESSMENT — CORNEAL DYSTROPHY - POSTERIOR
OS_POSTERIORDYSTROPHY: FUCHS GUTTATA
OD_POSTERIORDYSTROPHY: FUCHS GUTTATA

## 2024-12-02 ASSESSMENT — TONOMETRY
OD_IOP_MMHG: 17
OS_IOP_MMHG: 17